# Patient Record
Sex: FEMALE | URBAN - METROPOLITAN AREA
[De-identification: names, ages, dates, MRNs, and addresses within clinical notes are randomized per-mention and may not be internally consistent; named-entity substitution may affect disease eponyms.]

---

## 2024-04-19 ENCOUNTER — OV CON (OUTPATIENT)
Dept: URBAN - METROPOLITAN AREA CLINIC 72 | Facility: CLINIC | Age: 44
End: 2024-04-19
Payer: COMMERCIAL

## 2024-04-19 ENCOUNTER — LAB (OUTPATIENT)
Dept: URBAN - METROPOLITAN AREA CLINIC 72 | Facility: CLINIC | Age: 44
End: 2024-04-19

## 2024-04-19 VITALS
HEIGHT: 67 IN | HEART RATE: 70 BPM | WEIGHT: 196 LBS | TEMPERATURE: 97.5 F | DIASTOLIC BLOOD PRESSURE: 75 MMHG | BODY MASS INDEX: 30.76 KG/M2 | SYSTOLIC BLOOD PRESSURE: 131 MMHG

## 2024-04-19 DIAGNOSIS — K70.30 ALCOHOLIC CIRRHOSIS, UNSPECIFIED WHETHER ASCITES PRESENT: ICD-10-CM

## 2024-04-19 DIAGNOSIS — K64.9 RECTAL VARICES: ICD-10-CM

## 2024-04-19 DIAGNOSIS — D50.0 IRON DEFICIENCY ANEMIA DUE TO CHRONIC BLOOD LOSS: ICD-10-CM

## 2024-04-19 DIAGNOSIS — K21.9 GASTROESOPHAGEAL REFLUX DISEASE, UNSPECIFIED WHETHER ESOPHAGITIS PRESENT: ICD-10-CM

## 2024-04-19 PROBLEM — 724556004: Status: ACTIVE | Noted: 2024-04-19

## 2024-04-19 PROBLEM — 420054005: Status: ACTIVE | Noted: 2024-04-19

## 2024-04-19 PROCEDURE — 99204 OFFICE O/P NEW MOD 45 MIN: CPT | Performed by: NURSE PRACTITIONER

## 2024-04-19 RX ORDER — LOSARTAN POTASSIUM 100 MG/1
TABLET, FILM COATED ORAL
Qty: 90 TABLET | Status: ON HOLD | COMMUNITY

## 2024-04-19 RX ORDER — NADOLOL 40 MG/1
TABLET ORAL
Qty: 90 TABLET | Status: ON HOLD | COMMUNITY

## 2024-04-19 RX ORDER — LOSARTAN POTASSIUM 50 MG/1
TAKE 1 TABLET BY MOUTH ONCE DAILY FOR 90 DAYS TABLET, FILM COATED ORAL
Qty: 90 EACH | Refills: 1 | Status: ON HOLD | COMMUNITY

## 2024-04-19 RX ORDER — HYDROCHLOROTHIAZIDE 12.5 MG/1
TAKE 1 TABLET BY MOUTH IN THE MORNING TABLET ORAL
Qty: 90 EACH | Refills: 0 | Status: ON HOLD | COMMUNITY

## 2024-04-19 RX ORDER — FOLIC ACID/MULTIVIT,IRON,MINER 0.4MG-18MG
TABLET ORAL
Qty: 90 TABLET | Status: ON HOLD | COMMUNITY

## 2024-04-19 RX ORDER — PANTOPRAZOLE 40 MG/1
TABLET, DELAYED RELEASE ORAL
Qty: 90 TABLET | Status: ON HOLD | COMMUNITY

## 2024-04-19 RX ORDER — PANTOPRAZOLE 40 MG/1
TAKE 1 TABLET BY MOUTH ONCE DAILY TABLET, DELAYED RELEASE ORAL AS NEEDED
Refills: 0 | Status: ACTIVE | COMMUNITY

## 2024-04-19 RX ORDER — LOSARTAN POTASSIUM 100 MG/1
TAKE 1 TABLET BY MOUTH ONCE DAILY TABLET, FILM COATED ORAL
Qty: 90 EACH | Refills: 0 | Status: ACTIVE | COMMUNITY

## 2024-04-19 RX ORDER — NADOLOL 40 MG/1
TAKE 1 TABLET BY MOUTH ONCE DAILY TABLET ORAL
Qty: 90 EACH | Refills: 0 | Status: ACTIVE | COMMUNITY

## 2024-04-19 RX ORDER — PANTOPRAZOLE 40 MG/1
TAKE 1 TABLET BY MOUTH ONCE DAILY TABLET, DELAYED RELEASE ORAL
Qty: 90 EACH | Refills: 0 | Status: ON HOLD | COMMUNITY

## 2024-04-19 RX ORDER — LOSARTAN POTASSIUM 50 MG/1
TABLET, FILM COATED ORAL
Qty: 90 TABLET | Status: ON HOLD | COMMUNITY

## 2024-04-19 RX ORDER — HYDROCHLOROTHIAZIDE 12.5 MG/1
TABLET ORAL
Qty: 90 TABLET | Status: ACTIVE | COMMUNITY

## 2024-04-19 NOTE — HPI-TODAY'S VISIT:
43-year-old female referred by Xi Green for cirrhosis, SHARLA, portal hypertension and erosive gastritis. A copy of this note will be sent to the referring provider.   Past medical history of IBS diarrhea, CKD stage I, alcohol abuse, endometriosis, hypertension since 13 years old.  Father  from liver and colon cancer. Her mother had ALS.  Paternal grandmother had colon cancer.  Was hospitalized at [facility] from in early March for symptomatic anemia given 2 units packed red blood cells left AMA.  She is going to see her PCP again on Monday  She reports rectal bleeding started 3-4 years ago.  Saw Dr. Rojas in [place]. Had a colonoscopy.  Was told she has rectal varices.  She doesn't think she had polyp. Had EGD with Dr. Rojas as well.  Iron constipates her. No melena.  Last saw [facility] 2 weeks ago.  On PPI for GERD. She denies current alcohol us.  No abdominal pain.  Last seen by Dr. Westfall [facility] over a year ago.  Was told she needs a liver biopsy. She was unable to proceed with workup due to financial strain and her job demands.    LMP: Denies pregnancy

## 2024-04-19 NOTE — HPI-OTHER HISTORIES
Labs 3/6/2024. Iron low at 18, iron saturation 4%, ferritin 11. Normal vitamin B12. CBC: Hgb 6.4, HCT 21.6, MCV 66, platelet 102. CMP: Sodium 133, albumin 3.8, AST 77, creatinine 0.96, total bilirubin 1. Hemoglobin A1c 5.6. Labs 3/13/2024. CBC: Hgb 8.3, HCT 20.5, MCV 69, platelets 79. Labs 3/21/2024. INR 1.2, PT 12.1.

## 2024-04-21 PROBLEM — 235595009: Status: ACTIVE | Noted: 2024-04-21

## 2024-06-19 ENCOUNTER — DASHBOARD ENCOUNTERS (OUTPATIENT)
Age: 44
End: 2024-06-19

## 2024-06-19 ENCOUNTER — TELEPHONE ENCOUNTER (OUTPATIENT)
Dept: URBAN - METROPOLITAN AREA CLINIC 72 | Facility: CLINIC | Age: 44
End: 2024-06-19

## 2024-06-19 ENCOUNTER — OFFICE VISIT (OUTPATIENT)
Dept: URBAN - METROPOLITAN AREA CLINIC 72 | Facility: CLINIC | Age: 44
End: 2024-06-19
Payer: COMMERCIAL

## 2024-06-19 VITALS
BODY MASS INDEX: 29.38 KG/M2 | HEART RATE: 83 BPM | DIASTOLIC BLOOD PRESSURE: 80 MMHG | HEIGHT: 67 IN | WEIGHT: 187.2 LBS | TEMPERATURE: 97.9 F | SYSTOLIC BLOOD PRESSURE: 127 MMHG

## 2024-06-19 DIAGNOSIS — D50.0 IRON DEFICIENCY ANEMIA DUE TO CHRONIC BLOOD LOSS: ICD-10-CM

## 2024-06-19 DIAGNOSIS — K21.9 GASTROESOPHAGEAL REFLUX DISEASE, UNSPECIFIED WHETHER ESOPHAGITIS PRESENT: ICD-10-CM

## 2024-06-19 DIAGNOSIS — K70.30 ALCOHOLIC CIRRHOSIS, UNSPECIFIED WHETHER ASCITES PRESENT: ICD-10-CM

## 2024-06-19 DIAGNOSIS — K64.9 RECTAL VARICES: ICD-10-CM

## 2024-06-19 PROCEDURE — 99215 OFFICE O/P EST HI 40 MIN: CPT | Performed by: INTERNAL MEDICINE

## 2024-06-19 RX ORDER — PANTOPRAZOLE 40 MG/1
TAKE 1 TABLET BY MOUTH ONCE DAILY TABLET, DELAYED RELEASE ORAL AS NEEDED
Refills: 0 | Status: ACTIVE | COMMUNITY

## 2024-06-19 RX ORDER — NADOLOL 40 MG/1
TAKE 1 TABLET BY MOUTH ONCE DAILY TABLET ORAL
Qty: 90 EACH | Refills: 0 | Status: ACTIVE | COMMUNITY

## 2024-06-19 RX ORDER — PANTOPRAZOLE 40 MG/1
TAKE 1 TABLET BY MOUTH ONCE DAILY TABLET, DELAYED RELEASE ORAL
Qty: 90 EACH | Refills: 0 | Status: ON HOLD | COMMUNITY

## 2024-06-19 RX ORDER — LOSARTAN POTASSIUM 50 MG/1
TAKE 1 TABLET BY MOUTH ONCE DAILY FOR 90 DAYS TABLET, FILM COATED ORAL
Qty: 90 EACH | Refills: 1 | Status: ON HOLD | COMMUNITY

## 2024-06-19 RX ORDER — HYDROCHLOROTHIAZIDE 12.5 MG/1
TABLET ORAL
Qty: 90 TABLET | Status: ACTIVE | COMMUNITY

## 2024-06-19 RX ORDER — FOLIC ACID/MULTIVIT,IRON,MINER 0.4MG-18MG
TABLET ORAL
Qty: 90 TABLET | Status: ON HOLD | COMMUNITY

## 2024-06-19 RX ORDER — HYDROCHLOROTHIAZIDE 12.5 MG/1
TAKE 1 TABLET BY MOUTH IN THE MORNING TABLET ORAL
Qty: 90 EACH | Refills: 0 | Status: ON HOLD | COMMUNITY

## 2024-06-19 RX ORDER — LOSARTAN POTASSIUM 100 MG/1
TAKE 1 TABLET BY MOUTH ONCE DAILY TABLET, FILM COATED ORAL
Qty: 90 EACH | Refills: 0 | Status: ACTIVE | COMMUNITY

## 2024-06-19 RX ORDER — NADOLOL 40 MG/1
TABLET ORAL
Qty: 90 TABLET | Status: ON HOLD | COMMUNITY

## 2024-06-19 RX ORDER — LOSARTAN POTASSIUM 100 MG/1
TABLET, FILM COATED ORAL
Qty: 90 TABLET | Status: ON HOLD | COMMUNITY

## 2024-06-19 RX ORDER — PANTOPRAZOLE 40 MG/1
TABLET, DELAYED RELEASE ORAL
Qty: 90 TABLET | Status: ON HOLD | COMMUNITY

## 2024-06-19 RX ORDER — LOSARTAN POTASSIUM 50 MG/1
TABLET, FILM COATED ORAL
Qty: 90 TABLET | Status: ON HOLD | COMMUNITY

## 2024-06-19 NOTE — HPI-TODAY'S VISIT:
Mrs. Mcdaniel returns for follow-up.  Recall she is a 44-year-old last seen in office on 4/19/2024.  She has a history of cirrhosis complicated by portal hypertension, varices, iron deficiency anemia, portal hypertension, GERD and gastritis.  She has had a low MELD score.  She has a family history of colon cancer.  CT scan triple phase liver in 2022 showed a hiatal hernia and gastroesophageal varices with evidence of cirrhosis.  Lab work 4/23/2024: Hemoglobin 8.8, MCV 72, platelets 135, creatinine 0.71, sodium 136, AST 72, ALT 32, alk phos 100, bili 1.0, iron saturation 5, INR 1.3, AFP 5.9  Chronic liver disease workup 10/31/2022: ROSAMARIA negative, anti-smooth muscle negative, liver kidney less than 20, hepatitis C negative, hepatitis B core antibody nonreactive, surface antibody less than 5, surface antigen nonreactive, hepatitis C nonreactive, antimitochondrial bodies negative, ceruloplasmin 29, celiac testing negative  Colonoscopy 10/20/2022 hyperplastic sigmoid polyp  Since we last saw her she did reach out to [facility], was able to get appointment in August but transition to a new job and lost her insurance, currently on a [employer] plan.  Will have insurance again with her new job in 3 months.  She would like to hold off on any aggressive testing while she is on the [employer] plan.  She does understand that she needs to have appropriate disease related screening including variceal screening performed. Plan she feels well today without complaint.  She did receive an iron infusion.  She has had no further issues with rectal bleeding currently.  She has been abstinent from alcohol for over 6 months per her report.

## 2024-06-19 NOTE — EXAM-PHYSICAL EXAM
General--no acute distress, resting comfortably Eyes--anicteric, no pallor HENT--normocephalic, atraumatic head Neck--no lymphadenopathy, non tender Chest--non labored breathing, equal rise Abdomen--soft, non tender, non distended, no organomegaly Ext: LATIA, no obvious sores or rashes

## 2024-06-25 NOTE — PHYSICAL EXAM GASTROINTESTINAL
Ana Luisa Samuels is a 83 y.o. female (: 1940) presenting to address:    Chief Complaint   Patient presents with    Follow-up     Referral for Cardiologist         Vitals:    24 1248   BP: 114/70   Pulse: 63   Resp: 16   Temp: 98 °F (36.7 °C)   SpO2: 95%       Coordination of Care Questionaire:   1. \"Have you been to the ER, urgent care clinic since your last visit?  Hospitalized since your last visit?\" No     2. \"Have you seen or consulted any other health care providers outside of the [facility] since your last visit?\" Yes      3. For patients aged 45-75: Has the patient had a colonoscopy / FIT/ Cologuard? N/A      If the patient is female:    4. For patients aged 40-74: Has the patient had a mammogram within the past 2 years? N/a      5. For patients aged 21-65: Has the patient had a pap smear? N/a    Advanced Directive:   1. Do you have an Advanced Directive? No     2. Would you like information on Advanced Directives? No     soft, nontender, nondistended, no ascites

## 2024-06-27 ENCOUNTER — TELEPHONE ENCOUNTER (OUTPATIENT)
Dept: URBAN - METROPOLITAN AREA CLINIC 72 | Facility: CLINIC | Age: 44
End: 2024-06-27